# Patient Record
Sex: MALE
[De-identification: names, ages, dates, MRNs, and addresses within clinical notes are randomized per-mention and may not be internally consistent; named-entity substitution may affect disease eponyms.]

---

## 2021-12-29 ENCOUNTER — HOSPITAL ENCOUNTER (OUTPATIENT)
Dept: HOSPITAL 53 - M PLAIMG | Age: 31
End: 2021-12-29
Attending: INTERNAL MEDICINE

## 2021-12-29 DIAGNOSIS — M79.672: Primary | ICD-10-CM

## 2021-12-29 DIAGNOSIS — M79.671: ICD-10-CM

## 2021-12-29 NOTE — REP
INDICATION:

PAIN.



COMPARISON:

None.



TECHNIQUE:

Four views



FINDINGS:

No acute fracture or destructive osseous lesion.  The mortise is intact.



IMPRESSION:

No acute osseous abnormality





<Electronically signed by Jerry Morse > 12/29/21 [id number]

## 2021-12-29 NOTE — REP
INDICATION:

PAIN.



COMPARISON:

None.



TECHNIQUE:

Four views each foot



FINDINGS:

Bilateral: The joint spaces are symmetric and relatively well maintained.  There is no

evidence of acute fracture or destructive osseous lesion.





IMPRESSION:

Within normal limits bilateral.





<Electronically signed by Jerry Morse > 12/29/21 [id number]